# Patient Record
Sex: MALE | Race: WHITE | NOT HISPANIC OR LATINO | Employment: FULL TIME | URBAN - METROPOLITAN AREA
[De-identification: names, ages, dates, MRNs, and addresses within clinical notes are randomized per-mention and may not be internally consistent; named-entity substitution may affect disease eponyms.]

---

## 2024-08-17 ENCOUNTER — HOSPITAL ENCOUNTER (OUTPATIENT)
Dept: RADIOLOGY | Facility: MEDICAL CENTER | Age: 49
End: 2024-08-17
Payer: COMMERCIAL

## 2024-08-17 ENCOUNTER — HOSPITAL ENCOUNTER (EMERGENCY)
Facility: MEDICAL CENTER | Age: 49
End: 2024-08-17

## 2024-08-18 ENCOUNTER — APPOINTMENT (OUTPATIENT)
Dept: RADIOLOGY | Facility: MEDICAL CENTER | Age: 49
DRG: 515 | End: 2024-08-18
Attending: ORTHOPAEDIC SURGERY
Payer: COMMERCIAL

## 2024-08-18 ENCOUNTER — ANESTHESIA (OUTPATIENT)
Dept: SURGERY | Facility: MEDICAL CENTER | Age: 49
DRG: 515 | End: 2024-08-18
Payer: COMMERCIAL

## 2024-08-18 ENCOUNTER — HOSPITAL ENCOUNTER (INPATIENT)
Facility: MEDICAL CENTER | Age: 49
LOS: 2 days | DRG: 515 | End: 2024-08-20
Attending: EMERGENCY MEDICINE | Admitting: SURGERY
Payer: COMMERCIAL

## 2024-08-18 ENCOUNTER — HOSPITAL ENCOUNTER (OUTPATIENT)
Dept: RADIOLOGY | Facility: MEDICAL CENTER | Age: 49
End: 2024-08-18

## 2024-08-18 ENCOUNTER — APPOINTMENT (OUTPATIENT)
Dept: RADIOLOGY | Facility: MEDICAL CENTER | Age: 49
DRG: 515 | End: 2024-08-18
Attending: EMERGENCY MEDICINE
Payer: COMMERCIAL

## 2024-08-18 ENCOUNTER — ANESTHESIA EVENT (OUTPATIENT)
Dept: SURGERY | Facility: MEDICAL CENTER | Age: 49
DRG: 515 | End: 2024-08-18
Payer: COMMERCIAL

## 2024-08-18 DIAGNOSIS — J90 PLEURAL EFFUSION: ICD-10-CM

## 2024-08-18 DIAGNOSIS — S42.021A CLOSED DISPLACED FRACTURE OF SHAFT OF RIGHT CLAVICLE, INITIAL ENCOUNTER: ICD-10-CM

## 2024-08-18 DIAGNOSIS — S22.41XA CLOSED FRACTURE OF MULTIPLE RIBS OF RIGHT SIDE, INITIAL ENCOUNTER: ICD-10-CM

## 2024-08-18 DIAGNOSIS — T14.90XA TRAUMA: ICD-10-CM
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
## 2024-08-18 PROBLEM — I10 PRIMARY HYPERTENSION: Status: ACTIVE | Noted: 2024-08-18

## 2024-08-18 PROBLEM — R91.1 NODULE OF RIGHT LUNG: Status: ACTIVE | Noted: 2024-08-18

## 2024-08-18 PROBLEM — N18.2 STAGE 2 CHRONIC KIDNEY DISEASE: Status: ACTIVE | Noted: 2024-08-18

## 2024-08-18 PROBLEM — Z78.9 NO CONTRAINDICATION TO DEEP VEIN THROMBOSIS (DVT) PROPHYLAXIS: Status: ACTIVE | Noted: 2024-08-18

## 2024-08-18 PROBLEM — S27.321A RIGHT PULMONARY CONTUSION: Status: ACTIVE | Noted: 2024-08-18

## 2024-08-18 PROBLEM — G62.9 NEUROPATHY: Status: ACTIVE | Noted: 2024-08-18

## 2024-08-18 PROBLEM — Z53.09 CONTRAINDICATION TO DEEP VEIN THROMBOSIS (DVT) PROPHYLAXIS: Status: ACTIVE | Noted: 2024-08-18

## 2024-08-18 PROBLEM — R55 SYNCOPE: Status: ACTIVE | Noted: 2024-08-18

## 2024-08-18 PROBLEM — J96.01 ACUTE RESPIRATORY FAILURE WITH HYPOXIA (HCC): Status: ACTIVE | Noted: 2024-08-18

## 2024-08-18 PROBLEM — E11.9 TYPE 2 DIABETES MELLITUS WITHOUT COMPLICATION, WITHOUT LONG-TERM CURRENT USE OF INSULIN (HCC): Status: ACTIVE | Noted: 2024-08-18

## 2024-08-18 PROBLEM — E78.2 MIXED HYPERLIPIDEMIA: Status: ACTIVE | Noted: 2024-08-18

## 2024-08-18 PROBLEM — J93.9 PNEUMOTHORAX ON RIGHT: Status: ACTIVE | Noted: 2024-08-18

## 2024-08-18 PROBLEM — S22.43XA CLOSED FRACTURE OF MULTIPLE RIBS OF BOTH SIDES: Status: ACTIVE | Noted: 2024-08-18

## 2024-08-18 LAB
ABO GROUP BLD: NORMAL
ALBUMIN SERPL BCP-MCNC: 3.5 G/DL (ref 3.2–4.9)
ALBUMIN/GLOB SERPL: 1.3 G/DL
ALP SERPL-CCNC: 83 U/L (ref 30–99)
ALT SERPL-CCNC: 21 U/L (ref 2–50)
ANION GAP SERPL CALC-SCNC: 12 MMOL/L (ref 7–16)
APTT PPP: 30.3 SEC (ref 24.7–36)
AST SERPL-CCNC: 28 U/L (ref 12–45)
BILIRUB SERPL-MCNC: 0.3 MG/DL (ref 0.1–1.5)
BLD GP AB SCN SERPL QL: NORMAL
BUN SERPL-MCNC: 25 MG/DL (ref 8–22)
CALCIUM ALBUM COR SERPL-MCNC: 9 MG/DL (ref 8.5–10.5)
CALCIUM SERPL-MCNC: 8.6 MG/DL (ref 8.5–10.5)
CHLORIDE SERPL-SCNC: 104 MMOL/L (ref 96–112)
CO2 SERPL-SCNC: 21 MMOL/L (ref 20–33)
CREAT SERPL-MCNC: 1.88 MG/DL (ref 0.5–1.4)
ERYTHROCYTE [DISTWIDTH] IN BLOOD BY AUTOMATED COUNT: 43.7 FL (ref 35.9–50)
ETHANOL BLD-MCNC: <10.1 MG/DL
GFR SERPLBLD CREATININE-BSD FMLA CKD-EPI: 43 ML/MIN/1.73 M 2
GLOBULIN SER CALC-MCNC: 2.7 G/DL (ref 1.9–3.5)
GLUCOSE BLD STRIP.AUTO-MCNC: 112 MG/DL (ref 65–99)
GLUCOSE BLD STRIP.AUTO-MCNC: 135 MG/DL (ref 65–99)
GLUCOSE BLD STRIP.AUTO-MCNC: 138 MG/DL (ref 65–99)
GLUCOSE BLD STRIP.AUTO-MCNC: 93 MG/DL (ref 65–99)
GLUCOSE BLD STRIP.AUTO-MCNC: 94 MG/DL (ref 65–99)
GLUCOSE SERPL-MCNC: 126 MG/DL (ref 65–99)
HCT VFR BLD AUTO: 36.4 % (ref 42–52)
HGB BLD-MCNC: 12.2 G/DL (ref 14–18)
INR PPP: 0.93 (ref 0.87–1.13)
MCH RBC QN AUTO: 29.4 PG (ref 27–33)
MCHC RBC AUTO-ENTMCNC: 33.5 G/DL (ref 32.3–36.5)
MCV RBC AUTO: 87.7 FL (ref 81.4–97.8)
PLATELET # BLD AUTO: 249 K/UL (ref 164–446)
PMV BLD AUTO: 9.3 FL (ref 9–12.9)
POTASSIUM SERPL-SCNC: 4.8 MMOL/L (ref 3.6–5.5)
PROT SERPL-MCNC: 6.2 G/DL (ref 6–8.2)
PROTHROMBIN TIME: 12.6 SEC (ref 12–14.6)
RBC # BLD AUTO: 4.15 M/UL (ref 4.7–6.1)
RH BLD: NORMAL
SODIUM SERPL-SCNC: 137 MMOL/L (ref 135–145)
WBC # BLD AUTO: 9.6 K/UL (ref 4.8–10.8)

## 2024-08-18 PROCEDURE — 71045 X-RAY EXAM CHEST 1 VIEW: CPT

## 2024-08-18 PROCEDURE — 99223 1ST HOSP IP/OBS HIGH 75: CPT | Performed by: SURGERY

## 2024-08-18 PROCEDURE — 700111 HCHG RX REV CODE 636 W/ 250 OVERRIDE (IP): Performed by: EMERGENCY MEDICINE

## 2024-08-18 PROCEDURE — A9270 NON-COVERED ITEM OR SERVICE: HCPCS | Performed by: REGISTERED NURSE

## 2024-08-18 PROCEDURE — 160002 HCHG RECOVERY MINUTES (STAT): Performed by: ORTHOPAEDIC SURGERY

## 2024-08-18 PROCEDURE — 700105 HCHG RX REV CODE 258: Performed by: REGISTERED NURSE

## 2024-08-18 PROCEDURE — A9270 NON-COVERED ITEM OR SERVICE: HCPCS | Performed by: PHYSICIAN ASSISTANT

## 2024-08-18 PROCEDURE — 160035 HCHG PACU - 1ST 60 MINS PHASE I: Performed by: ORTHOPAEDIC SURGERY

## 2024-08-18 PROCEDURE — 99291 CRITICAL CARE FIRST HOUR: CPT

## 2024-08-18 PROCEDURE — 80053 COMPREHEN METABOLIC PANEL: CPT

## 2024-08-18 PROCEDURE — 85730 THROMBOPLASTIN TIME PARTIAL: CPT

## 2024-08-18 PROCEDURE — 700102 HCHG RX REV CODE 250 W/ 637 OVERRIDE(OP): Performed by: ANESTHESIOLOGY

## 2024-08-18 PROCEDURE — 82077 ASSAY SPEC XCP UR&BREATH IA: CPT

## 2024-08-18 PROCEDURE — 86850 RBC ANTIBODY SCREEN: CPT

## 2024-08-18 PROCEDURE — 23515 OPTX CLAVICULAR FX W/INT FIX: CPT | Mod: RT | Performed by: ORTHOPAEDIC SURGERY

## 2024-08-18 PROCEDURE — 700102 HCHG RX REV CODE 250 W/ 637 OVERRIDE(OP): Performed by: PHYSICIAN ASSISTANT

## 2024-08-18 PROCEDURE — 700101 HCHG RX REV CODE 250: Performed by: REGISTERED NURSE

## 2024-08-18 PROCEDURE — 700111 HCHG RX REV CODE 636 W/ 250 OVERRIDE (IP): Performed by: ANESTHESIOLOGY

## 2024-08-18 PROCEDURE — 700111 HCHG RX REV CODE 636 W/ 250 OVERRIDE (IP): Mod: JZ | Performed by: REGISTERED NURSE

## 2024-08-18 PROCEDURE — 85027 COMPLETE CBC AUTOMATED: CPT

## 2024-08-18 PROCEDURE — 85610 PROTHROMBIN TIME: CPT

## 2024-08-18 PROCEDURE — 160029 HCHG SURGERY MINUTES - 1ST 30 MINS LEVEL 4: Performed by: ORTHOPAEDIC SURGERY

## 2024-08-18 PROCEDURE — 160048 HCHG OR STATISTICAL LEVEL 1-5: Performed by: ORTHOPAEDIC SURGERY

## 2024-08-18 PROCEDURE — 160036 HCHG PACU - EA ADDL 30 MINS PHASE I: Performed by: ORTHOPAEDIC SURGERY

## 2024-08-18 PROCEDURE — 86900 BLOOD TYPING SEROLOGIC ABO: CPT

## 2024-08-18 PROCEDURE — 86901 BLOOD TYPING SEROLOGIC RH(D): CPT

## 2024-08-18 PROCEDURE — 160041 HCHG SURGERY MINUTES - EA ADDL 1 MIN LEVEL 4: Performed by: ORTHOPAEDIC SURGERY

## 2024-08-18 PROCEDURE — C1713 ANCHOR/SCREW BN/BN,TIS/BN: HCPCS | Performed by: ORTHOPAEDIC SURGERY

## 2024-08-18 PROCEDURE — 73000 X-RAY EXAM OF COLLAR BONE: CPT | Mod: RT

## 2024-08-18 PROCEDURE — 96374 THER/PROPH/DIAG INJ IV PUSH: CPT

## 2024-08-18 PROCEDURE — 700111 HCHG RX REV CODE 636 W/ 250 OVERRIDE (IP): Mod: JZ | Performed by: ANESTHESIOLOGY

## 2024-08-18 PROCEDURE — 94669 MECHANICAL CHEST WALL OSCILL: CPT

## 2024-08-18 PROCEDURE — 82962 GLUCOSE BLOOD TEST: CPT

## 2024-08-18 PROCEDURE — 99222 1ST HOSP IP/OBS MODERATE 55: CPT | Mod: 57 | Performed by: ORTHOPAEDIC SURGERY

## 2024-08-18 PROCEDURE — A9270 NON-COVERED ITEM OR SERVICE: HCPCS | Performed by: ANESTHESIOLOGY

## 2024-08-18 PROCEDURE — 770022 HCHG ROOM/CARE - ICU (200)

## 2024-08-18 PROCEDURE — G0390 TRAUMA RESPONS W/HOSP CRITI: HCPCS

## 2024-08-18 PROCEDURE — 160009 HCHG ANES TIME/MIN: Performed by: ORTHOPAEDIC SURGERY

## 2024-08-18 PROCEDURE — 0PS904Z REPOSITION RIGHT CLAVICLE WITH INTERNAL FIXATION DEVICE, OPEN APPROACH: ICD-10-PCS | Performed by: ORTHOPAEDIC SURGERY

## 2024-08-18 PROCEDURE — 700101 HCHG RX REV CODE 250: Performed by: ANESTHESIOLOGY

## 2024-08-18 PROCEDURE — 700102 HCHG RX REV CODE 250 W/ 637 OVERRIDE(OP): Performed by: REGISTERED NURSE

## 2024-08-18 PROCEDURE — 36415 COLL VENOUS BLD VENIPUNCTURE: CPT

## 2024-08-18 DEVICE — SCREW 2.5 MM NON-LOCKING TI X 12MM LONG (6TX8=48): Type: IMPLANTABLE DEVICE | Site: CHEST | Status: FUNCTIONAL

## 2024-08-18 DEVICE — SCREW 3.5 MM LOCKING TI X 16MM LONG (6TX8+2TX5=58): Type: IMPLANTABLE DEVICE | Site: CHEST | Status: FUNCTIONAL

## 2024-08-18 DEVICE — IMPLANTABLE DEVICE: Type: IMPLANTABLE DEVICE | Site: CHEST | Status: FUNCTIONAL

## 2024-08-18 DEVICE — SCREW 3.5 MM NON-LOCKING TI X 14MM LONG (6TX8+2TX5=58): Type: IMPLANTABLE DEVICE | Site: CHEST | Status: FUNCTIONAL

## 2024-08-18 DEVICE — SCREW 3.5 MM NON-LOCKING TI X 16MM LONG (6TX8+2TX5=58): Type: IMPLANTABLE DEVICE | Site: CHEST | Status: FUNCTIONAL

## 2024-08-18 DEVICE — SCREW 3.5 MM LOCKING TI X 14MM LONG (6TX8+2TX5=58): Type: IMPLANTABLE DEVICE | Site: CHEST | Status: FUNCTIONAL

## 2024-08-18 DEVICE — SCREW 2.5 MM NON-LOCKING TI X 14MM LONG (6TX8=48): Type: IMPLANTABLE DEVICE | Site: CHEST | Status: FUNCTIONAL

## 2024-08-18 RX ORDER — FAMOTIDINE 20 MG/1
20 TABLET, FILM COATED ORAL DAILY
Status: DISCONTINUED | OUTPATIENT
Start: 2024-08-18 | End: 2024-08-20 | Stop reason: HOSPADM

## 2024-08-18 RX ORDER — ACETAMINOPHEN 500 MG
1000 TABLET ORAL EVERY 6 HOURS PRN
Status: DISCONTINUED | OUTPATIENT
Start: 2024-08-23 | End: 2024-08-20 | Stop reason: HOSPADM

## 2024-08-18 RX ORDER — HYDROMORPHONE HYDROCHLORIDE 1 MG/ML
1 INJECTION, SOLUTION INTRAMUSCULAR; INTRAVENOUS; SUBCUTANEOUS
Status: DISCONTINUED | OUTPATIENT
Start: 2024-08-18 | End: 2024-08-19

## 2024-08-18 RX ORDER — HALOPERIDOL 5 MG/ML
1 INJECTION INTRAMUSCULAR
Status: DISCONTINUED | OUTPATIENT
Start: 2024-08-18 | End: 2024-08-18 | Stop reason: HOSPADM

## 2024-08-18 RX ORDER — IBUPROFEN 200 MG
600 TABLET ORAL EVERY 6 HOURS PRN
Status: ON HOLD | COMMUNITY
End: 2024-08-20

## 2024-08-18 RX ORDER — DIPHENHYDRAMINE HYDROCHLORIDE 50 MG/ML
12.5 INJECTION, SOLUTION INTRAMUSCULAR; INTRAVENOUS
Status: DISCONTINUED | OUTPATIENT
Start: 2024-08-18 | End: 2024-08-18 | Stop reason: HOSPADM

## 2024-08-18 RX ORDER — HYDROMORPHONE HYDROCHLORIDE 1 MG/ML
INJECTION, SOLUTION INTRAMUSCULAR; INTRAVENOUS; SUBCUTANEOUS
Status: COMPLETED | OUTPATIENT
Start: 2024-08-18 | End: 2024-08-18

## 2024-08-18 RX ORDER — OXYCODONE HYDROCHLORIDE 5 MG/1
5 TABLET ORAL
Status: DISCONTINUED | OUTPATIENT
Start: 2024-08-18 | End: 2024-08-18

## 2024-08-18 RX ORDER — SODIUM CHLORIDE, SODIUM LACTATE, POTASSIUM CHLORIDE, CALCIUM CHLORIDE 600; 310; 30; 20 MG/100ML; MG/100ML; MG/100ML; MG/100ML
INJECTION, SOLUTION INTRAVENOUS CONTINUOUS
Status: DISCONTINUED | OUTPATIENT
Start: 2024-08-18 | End: 2024-08-19

## 2024-08-18 RX ORDER — IPRATROPIUM BROMIDE AND ALBUTEROL SULFATE 2.5; .5 MG/3ML; MG/3ML
3 SOLUTION RESPIRATORY (INHALATION)
Status: DISCONTINUED | OUTPATIENT
Start: 2024-08-18 | End: 2024-08-18 | Stop reason: HOSPADM

## 2024-08-18 RX ORDER — GABAPENTIN 300 MG/1
300 CAPSULE ORAL EVERY 8 HOURS
Status: DISCONTINUED | OUTPATIENT
Start: 2024-08-18 | End: 2024-08-20 | Stop reason: HOSPADM

## 2024-08-18 RX ORDER — OXYCODONE HYDROCHLORIDE 10 MG/1
10 TABLET ORAL
Status: DISCONTINUED | OUTPATIENT
Start: 2024-08-18 | End: 2024-08-19

## 2024-08-18 RX ORDER — DEXTROSE MONOHYDRATE 25 G/50ML
12.5 INJECTION, SOLUTION INTRAVENOUS
Status: DISCONTINUED | OUTPATIENT
Start: 2024-08-18 | End: 2024-08-18 | Stop reason: HOSPADM

## 2024-08-18 RX ORDER — OXYCODONE AND ACETAMINOPHEN 5; 325 MG/1; MG/1
1 TABLET ORAL
Status: COMPLETED | OUTPATIENT
Start: 2024-08-18 | End: 2024-08-18

## 2024-08-18 RX ORDER — LIDOCAINE HYDROCHLORIDE 20 MG/ML
INJECTION, SOLUTION EPIDURAL; INFILTRATION; INTRACAUDAL; PERINEURAL PRN
Status: DISCONTINUED | OUTPATIENT
Start: 2024-08-18 | End: 2024-08-18 | Stop reason: SURG

## 2024-08-18 RX ORDER — HYDROMORPHONE HYDROCHLORIDE 1 MG/ML
0.2 INJECTION, SOLUTION INTRAMUSCULAR; INTRAVENOUS; SUBCUTANEOUS
Status: DISCONTINUED | OUTPATIENT
Start: 2024-08-18 | End: 2024-08-18 | Stop reason: HOSPADM

## 2024-08-18 RX ORDER — SIMVASTATIN 40 MG
40 TABLET ORAL NIGHTLY
Status: DISCONTINUED | OUTPATIENT
Start: 2024-08-18 | End: 2024-08-20 | Stop reason: HOSPADM

## 2024-08-18 RX ORDER — OXYCODONE AND ACETAMINOPHEN 5; 325 MG/1; MG/1
2 TABLET ORAL
Status: COMPLETED | OUTPATIENT
Start: 2024-08-18 | End: 2024-08-18

## 2024-08-18 RX ORDER — NICOTINE 21 MG/24HR
21 PATCH, TRANSDERMAL 24 HOURS TRANSDERMAL
Status: DISCONTINUED | OUTPATIENT
Start: 2024-08-18 | End: 2024-08-20 | Stop reason: HOSPADM

## 2024-08-18 RX ORDER — METOPROLOL TARTRATE 1 MG/ML
1 INJECTION, SOLUTION INTRAVENOUS
Status: DISCONTINUED | OUTPATIENT
Start: 2024-08-18 | End: 2024-08-18 | Stop reason: HOSPADM

## 2024-08-18 RX ORDER — HYDROMORPHONE HYDROCHLORIDE 1 MG/ML
0.1 INJECTION, SOLUTION INTRAMUSCULAR; INTRAVENOUS; SUBCUTANEOUS
Status: DISCONTINUED | OUTPATIENT
Start: 2024-08-18 | End: 2024-08-18 | Stop reason: HOSPADM

## 2024-08-18 RX ORDER — SIMVASTATIN 40 MG
40 TABLET ORAL NIGHTLY
COMMUNITY

## 2024-08-18 RX ORDER — MIDAZOLAM HYDROCHLORIDE 1 MG/ML
INJECTION INTRAMUSCULAR; INTRAVENOUS PRN
Status: DISCONTINUED | OUTPATIENT
Start: 2024-08-18 | End: 2024-08-18 | Stop reason: SURG

## 2024-08-18 RX ORDER — POLYETHYLENE GLYCOL 3350 17 G/17G
1 POWDER, FOR SOLUTION ORAL 2 TIMES DAILY
Status: DISCONTINUED | OUTPATIENT
Start: 2024-08-18 | End: 2024-08-20 | Stop reason: HOSPADM

## 2024-08-18 RX ORDER — DOCUSATE SODIUM 100 MG/1
100 CAPSULE, LIQUID FILLED ORAL 2 TIMES DAILY
Status: DISCONTINUED | OUTPATIENT
Start: 2024-08-18 | End: 2024-08-20 | Stop reason: HOSPADM

## 2024-08-18 RX ORDER — METAXALONE 800 MG/1
800 TABLET ORAL 3 TIMES DAILY
Status: DISCONTINUED | OUTPATIENT
Start: 2024-08-18 | End: 2024-08-20 | Stop reason: HOSPADM

## 2024-08-18 RX ORDER — ACETAMINOPHEN 500 MG
1000 TABLET ORAL EVERY 6 HOURS PRN
COMMUNITY

## 2024-08-18 RX ORDER — SODIUM CHLORIDE, SODIUM LACTATE, POTASSIUM CHLORIDE, CALCIUM CHLORIDE 600; 310; 30; 20 MG/100ML; MG/100ML; MG/100ML; MG/100ML
INJECTION, SOLUTION INTRAVENOUS CONTINUOUS
Status: DISCONTINUED | OUTPATIENT
Start: 2024-08-18 | End: 2024-08-18 | Stop reason: HOSPADM

## 2024-08-18 RX ORDER — HYDRALAZINE HYDROCHLORIDE 20 MG/ML
10 INJECTION INTRAMUSCULAR; INTRAVENOUS EVERY 4 HOURS PRN
Status: DISCONTINUED | OUTPATIENT
Start: 2024-08-18 | End: 2024-08-20 | Stop reason: HOSPADM

## 2024-08-18 RX ORDER — DAPAGLIFLOZIN 10 MG/1
10 TABLET, FILM COATED ORAL DAILY
COMMUNITY

## 2024-08-18 RX ORDER — LISINOPRIL 20 MG/1
20 TABLET ORAL DAILY
COMMUNITY

## 2024-08-18 RX ORDER — DEXTROSE MONOHYDRATE 25 G/50ML
25 INJECTION, SOLUTION INTRAVENOUS
Status: DISCONTINUED | OUTPATIENT
Start: 2024-08-18 | End: 2024-08-20 | Stop reason: HOSPADM

## 2024-08-18 RX ORDER — ONDANSETRON 2 MG/ML
4 INJECTION INTRAMUSCULAR; INTRAVENOUS
Status: DISCONTINUED | OUTPATIENT
Start: 2024-08-18 | End: 2024-08-18 | Stop reason: HOSPADM

## 2024-08-18 RX ORDER — DAPAGLIFLOZIN 10 MG/1
10 TABLET, FILM COATED ORAL DAILY
Status: DISCONTINUED | OUTPATIENT
Start: 2024-08-18 | End: 2024-08-20 | Stop reason: HOSPADM

## 2024-08-18 RX ORDER — DEXAMETHASONE SODIUM PHOSPHATE 4 MG/ML
INJECTION, SOLUTION INTRA-ARTICULAR; INTRALESIONAL; INTRAMUSCULAR; INTRAVENOUS; SOFT TISSUE PRN
Status: DISCONTINUED | OUTPATIENT
Start: 2024-08-18 | End: 2024-08-18 | Stop reason: SURG

## 2024-08-18 RX ORDER — HYDROMORPHONE HYDROCHLORIDE 1 MG/ML
0.5 INJECTION, SOLUTION INTRAMUSCULAR; INTRAVENOUS; SUBCUTANEOUS
Status: DISCONTINUED | OUTPATIENT
Start: 2024-08-18 | End: 2024-08-18

## 2024-08-18 RX ORDER — ONDANSETRON 2 MG/ML
INJECTION INTRAMUSCULAR; INTRAVENOUS PRN
Status: DISCONTINUED | OUTPATIENT
Start: 2024-08-18 | End: 2024-08-18 | Stop reason: SURG

## 2024-08-18 RX ORDER — CEFAZOLIN SODIUM 1 G/3ML
INJECTION, POWDER, FOR SOLUTION INTRAMUSCULAR; INTRAVENOUS PRN
Status: DISCONTINUED | OUTPATIENT
Start: 2024-08-18 | End: 2024-08-18 | Stop reason: SURG

## 2024-08-18 RX ORDER — ACETAMINOPHEN 500 MG
1000 TABLET ORAL EVERY 6 HOURS
Status: DISCONTINUED | OUTPATIENT
Start: 2024-08-18 | End: 2024-08-20 | Stop reason: HOSPADM

## 2024-08-18 RX ORDER — AMOXICILLIN 250 MG
1 CAPSULE ORAL NIGHTLY
Status: DISCONTINUED | OUTPATIENT
Start: 2024-08-18 | End: 2024-08-20 | Stop reason: HOSPADM

## 2024-08-18 RX ORDER — BISACODYL 10 MG
10 SUPPOSITORY, RECTAL RECTAL
Status: DISCONTINUED | OUTPATIENT
Start: 2024-08-18 | End: 2024-08-20 | Stop reason: HOSPADM

## 2024-08-18 RX ORDER — OXYCODONE HYDROCHLORIDE 5 MG/1
5 TABLET ORAL
Status: DISCONTINUED | OUTPATIENT
Start: 2024-08-18 | End: 2024-08-19

## 2024-08-18 RX ORDER — LIDOCAINE 4 G/G
2 PATCH TOPICAL EVERY 24 HOURS
Status: DISCONTINUED | OUTPATIENT
Start: 2024-08-18 | End: 2024-08-20 | Stop reason: HOSPADM

## 2024-08-18 RX ORDER — FAMOTIDINE 20 MG/1
20 TABLET, FILM COATED ORAL DAILY
COMMUNITY

## 2024-08-18 RX ORDER — GABAPENTIN 100 MG/1
100 CAPSULE ORAL EVERY EVENING
COMMUNITY

## 2024-08-18 RX ORDER — ROCURONIUM BROMIDE 10 MG/ML
INJECTION, SOLUTION INTRAVENOUS PRN
Status: DISCONTINUED | OUTPATIENT
Start: 2024-08-18 | End: 2024-08-18 | Stop reason: SURG

## 2024-08-18 RX ORDER — LISINOPRIL 20 MG/1
20 TABLET ORAL DAILY
Status: DISCONTINUED | OUTPATIENT
Start: 2024-08-18 | End: 2024-08-19

## 2024-08-18 RX ORDER — HYDRALAZINE HYDROCHLORIDE 20 MG/ML
5 INJECTION INTRAMUSCULAR; INTRAVENOUS
Status: DISCONTINUED | OUTPATIENT
Start: 2024-08-18 | End: 2024-08-18 | Stop reason: HOSPADM

## 2024-08-18 RX ORDER — AMOXICILLIN 250 MG
1 CAPSULE ORAL
Status: DISCONTINUED | OUTPATIENT
Start: 2024-08-18 | End: 2024-08-20 | Stop reason: HOSPADM

## 2024-08-18 RX ORDER — LIDOCAINE HYDROCHLORIDE 40 MG/ML
SOLUTION TOPICAL PRN
Status: DISCONTINUED | OUTPATIENT
Start: 2024-08-18 | End: 2024-08-18 | Stop reason: SURG

## 2024-08-18 RX ORDER — ENOXAPARIN SODIUM 100 MG/ML
40 INJECTION SUBCUTANEOUS DAILY
Status: DISCONTINUED | OUTPATIENT
Start: 2024-08-19 | End: 2024-08-20 | Stop reason: HOSPADM

## 2024-08-18 RX ORDER — MEPERIDINE HYDROCHLORIDE 25 MG/ML
12.5 INJECTION INTRAMUSCULAR; INTRAVENOUS; SUBCUTANEOUS
Status: DISCONTINUED | OUTPATIENT
Start: 2024-08-18 | End: 2024-08-18 | Stop reason: HOSPADM

## 2024-08-18 RX ORDER — ONDANSETRON 2 MG/ML
4 INJECTION INTRAMUSCULAR; INTRAVENOUS EVERY 4 HOURS PRN
Status: DISCONTINUED | OUTPATIENT
Start: 2024-08-18 | End: 2024-08-20 | Stop reason: HOSPADM

## 2024-08-18 RX ORDER — GABAPENTIN 100 MG/1
200 CAPSULE ORAL EVERY 8 HOURS
Status: DISCONTINUED | OUTPATIENT
Start: 2024-08-18 | End: 2024-08-18

## 2024-08-18 RX ORDER — HYDROMORPHONE HYDROCHLORIDE 1 MG/ML
0.4 INJECTION, SOLUTION INTRAMUSCULAR; INTRAVENOUS; SUBCUTANEOUS
Status: DISCONTINUED | OUTPATIENT
Start: 2024-08-18 | End: 2024-08-18 | Stop reason: HOSPADM

## 2024-08-18 RX ORDER — HYDROMORPHONE HYDROCHLORIDE 2 MG/ML
INJECTION, SOLUTION INTRAMUSCULAR; INTRAVENOUS; SUBCUTANEOUS PRN
Status: DISCONTINUED | OUTPATIENT
Start: 2024-08-18 | End: 2024-08-18 | Stop reason: SURG

## 2024-08-18 RX ORDER — ONDANSETRON 4 MG/1
4 TABLET, ORALLY DISINTEGRATING ORAL EVERY 4 HOURS PRN
Status: DISCONTINUED | OUTPATIENT
Start: 2024-08-18 | End: 2024-08-20 | Stop reason: HOSPADM

## 2024-08-18 RX ORDER — ERGOCALCIFEROL 1.25 MG/1
50000 CAPSULE, LIQUID FILLED ORAL
COMMUNITY

## 2024-08-18 RX ORDER — OXYCODONE HYDROCHLORIDE 10 MG/1
10 TABLET ORAL
Status: DISCONTINUED | OUTPATIENT
Start: 2024-08-18 | End: 2024-08-18

## 2024-08-18 RX ADMIN — ROCURONIUM BROMIDE 50 MG: 50 INJECTION, SOLUTION INTRAVENOUS at 16:04

## 2024-08-18 RX ADMIN — MIDAZOLAM HYDROCHLORIDE 2 MG: 2 INJECTION, SOLUTION INTRAMUSCULAR; INTRAVENOUS at 16:00

## 2024-08-18 RX ADMIN — SENNOSIDES AND DOCUSATE SODIUM 1 TABLET: 50; 8.6 TABLET ORAL at 21:10

## 2024-08-18 RX ADMIN — DAPAGLIFLOZIN 10 MG: 10 TABLET, FILM COATED ORAL at 10:06

## 2024-08-18 RX ADMIN — ACETAMINOPHEN 1000 MG: 500 TABLET ORAL at 13:02

## 2024-08-18 RX ADMIN — HYDROMORPHONE HYDROCHLORIDE 0.4 MG: 1 INJECTION, SOLUTION INTRAMUSCULAR; INTRAVENOUS; SUBCUTANEOUS at 18:04

## 2024-08-18 RX ADMIN — LIDOCAINE 2 PATCH: 4 PATCH TOPICAL at 09:03

## 2024-08-18 RX ADMIN — HYDROMORPHONE HYDROCHLORIDE 0.4 MG: 1 INJECTION, SOLUTION INTRAMUSCULAR; INTRAVENOUS; SUBCUTANEOUS at 17:50

## 2024-08-18 RX ADMIN — LIDOCAINE HYDROCHLORIDE 4 ML: 40 SOLUTION TOPICAL at 16:06

## 2024-08-18 RX ADMIN — ONDANSETRON 4 MG: 2 INJECTION INTRAMUSCULAR; INTRAVENOUS at 16:30

## 2024-08-18 RX ADMIN — METAXALONE 800 MG: 800 TABLET ORAL at 19:29

## 2024-08-18 RX ADMIN — OXYCODONE HYDROCHLORIDE 5 MG: 5 TABLET ORAL at 10:06

## 2024-08-18 RX ADMIN — LISINOPRIL 20 MG: 20 TABLET ORAL at 10:06

## 2024-08-18 RX ADMIN — METAXALONE 800 MG: 800 TABLET ORAL at 09:03

## 2024-08-18 RX ADMIN — GABAPENTIN 300 MG: 300 CAPSULE ORAL at 13:02

## 2024-08-18 RX ADMIN — SUGAMMADEX 200 MG: 100 INJECTION, SOLUTION INTRAVENOUS at 16:30

## 2024-08-18 RX ADMIN — ACETAMINOPHEN 1000 MG: 500 TABLET ORAL at 19:30

## 2024-08-18 RX ADMIN — HYDROMORPHONE HYDROCHLORIDE 0.5 MG: 1 INJECTION, SOLUTION INTRAMUSCULAR; INTRAVENOUS; SUBCUTANEOUS at 08:51

## 2024-08-18 RX ADMIN — PROPOFOL 50 MG: 10 INJECTION, EMULSION INTRAVENOUS at 16:33

## 2024-08-18 RX ADMIN — CEFAZOLIN 2 G: 1 INJECTION, POWDER, FOR SOLUTION INTRAMUSCULAR; INTRAVENOUS at 16:04

## 2024-08-18 RX ADMIN — FAMOTIDINE 20 MG: 20 TABLET, FILM COATED ORAL at 10:06

## 2024-08-18 RX ADMIN — HYDROMORPHONE HYDROCHLORIDE 0.4 MG: 1 INJECTION, SOLUTION INTRAMUSCULAR; INTRAVENOUS; SUBCUTANEOUS at 17:30

## 2024-08-18 RX ADMIN — HYDROMORPHONE HYDROCHLORIDE 1 MG: 2 INJECTION INTRAMUSCULAR; INTRAVENOUS; SUBCUTANEOUS at 16:00

## 2024-08-18 RX ADMIN — SODIUM CHLORIDE, POTASSIUM CHLORIDE, SODIUM LACTATE AND CALCIUM CHLORIDE: 600; 310; 30; 20 INJECTION, SOLUTION INTRAVENOUS at 08:56

## 2024-08-18 RX ADMIN — FENTANYL CITRATE 50 MCG: 50 INJECTION, SOLUTION INTRAMUSCULAR; INTRAVENOUS at 17:09

## 2024-08-18 RX ADMIN — HYDROMORPHONE HYDROCHLORIDE 0.4 MG: 1 INJECTION, SOLUTION INTRAMUSCULAR; INTRAVENOUS; SUBCUTANEOUS at 17:20

## 2024-08-18 RX ADMIN — POLYETHYLENE GLYCOL 3350 1 PACKET: 17 POWDER, FOR SOLUTION ORAL at 09:03

## 2024-08-18 RX ADMIN — METAXALONE 800 MG: 800 TABLET ORAL at 13:02

## 2024-08-18 RX ADMIN — ACETAMINOPHEN 1000 MG: 500 TABLET ORAL at 09:04

## 2024-08-18 RX ADMIN — LIDOCAINE HYDROCHLORIDE 80 MG: 20 INJECTION, SOLUTION EPIDURAL; INFILTRATION; INTRACAUDAL at 16:04

## 2024-08-18 RX ADMIN — DEXAMETHASONE SODIUM PHOSPHATE 8 MG: 4 INJECTION INTRA-ARTICULAR; INTRALESIONAL; INTRAMUSCULAR; INTRAVENOUS; SOFT TISSUE at 16:04

## 2024-08-18 RX ADMIN — GABAPENTIN 200 MG: 100 CAPSULE ORAL at 09:03

## 2024-08-18 RX ADMIN — OXYCODONE AND ACETAMINOPHEN 2 TABLET: 5; 325 TABLET ORAL at 17:01

## 2024-08-18 RX ADMIN — FENTANYL CITRATE 50 MCG: 50 INJECTION, SOLUTION INTRAMUSCULAR; INTRAVENOUS at 17:03

## 2024-08-18 RX ADMIN — PROPOFOL 180 MG: 10 INJECTION, EMULSION INTRAVENOUS at 16:04

## 2024-08-18 RX ADMIN — OXYCODONE HYDROCHLORIDE 5 MG: 5 TABLET ORAL at 21:11

## 2024-08-18 RX ADMIN — HYDROMORPHONE HYDROCHLORIDE 0.4 MG: 1 INJECTION, SOLUTION INTRAMUSCULAR; INTRAVENOUS; SUBCUTANEOUS at 17:56

## 2024-08-18 RX ADMIN — GABAPENTIN 300 MG: 300 CAPSULE ORAL at 21:10

## 2024-08-18 RX ADMIN — DOCUSATE SODIUM 100 MG: 100 CAPSULE, LIQUID FILLED ORAL at 09:04

## 2024-08-18 RX ADMIN — HYDROMORPHONE HYDROCHLORIDE 0.5 MG: 1 INJECTION, SOLUTION INTRAMUSCULAR; INTRAVENOUS; SUBCUTANEOUS at 06:36

## 2024-08-18 RX ADMIN — SIMVASTATIN 40 MG: 20 TABLET, FILM COATED ORAL at 21:10

## 2024-08-18 RX ADMIN — NICOTINE TRANSDERMAL SYSTEM 21 MG: 21 PATCH, EXTENDED RELEASE TRANSDERMAL at 09:03

## 2024-08-18 ASSESSMENT — COPD QUESTIONNAIRES
HAVE YOU SMOKED AT LEAST 100 CIGARETTES IN YOUR ENTIRE LIFE: YES
COPD SCREENING SCORE: 3
DO YOU EVER COUGH UP ANY MUCUS OR PHLEGM?: NO/ONLY WITH OCCASIONAL COLDS OR INFECTIONS
DURING THE PAST 4 WEEKS HOW MUCH DID YOU FEEL SHORT OF BREATH: SOME OF THE TIME

## 2024-08-18 ASSESSMENT — PAIN DESCRIPTION - PAIN TYPE
TYPE: ACUTE PAIN
TYPE: SURGICAL PAIN
TYPE: ACUTE PAIN
TYPE: ACUTE PAIN
TYPE: SURGICAL PAIN
TYPE: ACUTE PAIN
TYPE: SURGICAL PAIN
TYPE: SURGICAL PAIN
TYPE: ACUTE PAIN
TYPE: SURGICAL PAIN
TYPE: ACUTE PAIN
TYPE: SURGICAL PAIN
TYPE: SURGICAL PAIN
TYPE: ACUTE PAIN

## 2024-08-18 NOTE — ASSESSMENT & PLAN NOTE
Requiring 2L O2 by nasal cannula to maintain SpO2 >90%.   Wean oxygen as tolerated.   Aggressive pulmonary hygiene and serial chest radiography.  Tolerating room air

## 2024-08-18 NOTE — PROGRESS NOTES
0840: Pt arrived to ICU    Vitals:   HR: 86  BP: 200/87, pain medication administered  RR: 18  SaO2: 94 on 2L O2  Wt: 80.9kg  Temp 97.3F   _____________________________________________________________    2 RN skin check completed with Kristina        Devices in use, assessed under and interventions (as appropriate) for skin protection:  SCDs, BP cuff, SaO2 monitor      Interventions in place such as:   q2 hour turns  Keeping skin clean and dry  Use of products such as barrier wipes/cream  Waffle cushion while OOB: yes  Bed Type: trauma bed    ______________________________________________________________    Personal belongings:   Belongings, sent with patient's friend    ____________________________________________________________    4 Eyes Skin Assessment Completed by ALEX Jones and ALEX Acevedo.    Head WDL  Ears WDL  Nose WDL reddness to bridge of nose  Mouth WDL  Neck WDL  Breast/Chest WDL  Shoulder Blades WDL  Spine WDL  (R) Arm/Elbow/Hand WDL  (L) Arm/Elbow/Hand WDL  Abdomen WDL  Groin WDL  Scrotum/Coccyx/Buttocks WDL  (R) Leg WDL  (L) Leg WDL  (R) Heel/Foot/Toe WDL  (L) Heel/Foot/Toe WDL          Devices In Places ECG, Blood Pressure Cuff, Pulse Ox, SCD's, and Nasal Cannula      Interventions In Place Gray Ear Foams, Sacral Mepilex, and Q2 Turns    Possible Skin Injury No    Pictures Uploaded Into Epic N/A  Wound Consult Placed N/A  RN Wound Prevention Protocol Ordered No \

## 2024-08-18 NOTE — ANESTHESIA PROCEDURE NOTES
Airway    Date/Time: 8/18/2024 4:06 PM    Performed by: Sal Cabrera M.D.  Authorized by: Sal Cabrera M.D.    Location:  OR  Urgency:  Elective  Indications for Airway Management:  Anesthesia      Spontaneous Ventilation: absent    Sedation Level:  Deep  Preoxygenated: Yes    Patient Position:  Sniffing  Mask Difficulty Assessment:  2 - vent by mask + OA or adjuvant +/- NMBA  Final Airway Type:  Endotracheal airway  Final Endotracheal Airway:  ETT  Cuffed: Yes    Technique Used for Successful ETT Placement:  Video laryngoscopy    Insertion Site:  Oral  Blade Type:  Glide  Laryngoscope Blade/Videolaryngoscope Blade Size:  4  ETT Size (mm):  7.5  Measured from:  Teeth  ETT to Teeth (cm):  22  Placement Verified by: auscultation and capnometry    Cormack-Lehane Classification:  Grade I - full view of glottis  Number of Attempts at Approach:  2  Ventilation Between Attempts:  BVM  Number of Other Approaches Attempted:  1  Unsuccessful Approach(es) for ETT:  Direct laryngoscopy   Grade 3 view of cords with DL. Easy GlideScope intubation.
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
## 2024-08-18 NOTE — ED NOTES
Attempted RA trial, patient dropped to 86% on RA. Patient placed on 1L nasal cannula and now at 94%

## 2024-08-18 NOTE — CARE PLAN
"The patient is Stable - Low risk of patient condition declining or worsening    Shift Goals  Clinical Goals: ORIF; pain mgmt; pulmonary hygiene  Patient Goals: pain mgmt; rest; go home; \"race more\"  Family Goals: updates    Progress made toward(s) clinical / shift goals:     Problem: Pain - Standard  Goal: Alleviation of pain or a reduction in pain to the patient’s comfort goal  Outcome: Progressing     Problem: Knowledge Deficit - Standard  Goal: Patient and family/care givers will demonstrate understanding of plan of care, disease process/condition, diagnostic tests and medications  Outcome: Progressing     Problem: Skin Integrity  Goal: Skin integrity is maintained or improved  Outcome: Progressing     Problem: Fall Risk  Goal: Patient will remain free from falls  Outcome: Progressing       Patient is not progressing towards the following goals:      "

## 2024-08-18 NOTE — ED NOTES
Medication Reconciliation    Medication reconciliation is complete per patient reporting.  - Allergies reviewed.  - No outpatient antibiotics in the last 30 days.  - No anticoagulants in the last 14 days.  - Patient's home pharmacy is [facility] in [place] [phone].    Albertina Dawn, Pharmacy Intern
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
## 2024-08-18 NOTE — PROGRESS NOTES
Patient transported to [facility] Pre-op 9 and received by preop RNs. Report given to receiving RN via voalte prior to transport.
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
## 2024-08-18 NOTE — ASSESSMENT & PLAN NOTE
Small right pneumothorax.   No chest tube required on admission.  Aggressive pulmonary hygiene and serial chest radiography.

## 2024-08-18 NOTE — ASSESSMENT & PLAN NOTE
Right midshaft clavicle fracture.  8/18 Open reduction internal fixation of right clavicle fracture.   Weight bearing status - Partial weightbearing RUE. No lifting > cup of coffee, ok for ADLs, sling for comfort.  Follow up with orthopedic surgery in 2 weeks  Germán Hummel MD. Orthopedic Surgeon. [facility].
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
## 2024-08-18 NOTE — ANESTHESIA TIME REPORT
Anesthesia Start and Stop Event Times       Date Time Event    8/18/2024 1540 Ready for Procedure     1558 Anesthesia Start     1643 Anesthesia Stop          Responsible Staff  08/18/24      Name Role Begin End    Sal Cabrera M.D. Anesth 155 1644          Overtime Reason:  no overtime (within assigned shift)    Comments:
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
## 2024-08-18 NOTE — ED NOTES
Bedside report received from off going RN Tr, assumed care of patient.  POC discussed with patient. Call light within reach, all needs addressed at this time.       Fall risk interventions in place: Keep floor surfaces clean and dry and Accompanied to restroom (all applicable per [facility] Fall risk assessment)   Continuous monitoring: Cardiac Leads, Pulse Ox, or Blood Pressure  IVF/IV medications: Not Applicable   Oxygen: How many liters 2L  Bedside sitter: Not Applicable   Isolation: Not Applicable

## 2024-08-18 NOTE — ED NOTES
Pt being taken upstairs at this time by RNs via gurney. Pt is awake and alert, talking to staff, in no apparent distress at time of transfer. Pt's paperwork and belongings sent upstairs with pt and RNs.
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
## 2024-08-18 NOTE — ASSESSMENT & PLAN NOTE
Right fifth through eighth rib fractures.   Aggressive pulmonary hygiene and multimodal pain management and serial chest radiography.

## 2024-08-18 NOTE — ASSESSMENT & PLAN NOTE
Baseline creatinine 1.6, followed by nephrology baseline   Avoid nephrotoxic agents.   Gentle fluid rehydration.  Trend renal indices.

## 2024-08-19 ENCOUNTER — APPOINTMENT (OUTPATIENT)
Dept: RADIOLOGY | Facility: MEDICAL CENTER | Age: 49
DRG: 515 | End: 2024-08-19
Attending: REGISTERED NURSE
Payer: COMMERCIAL

## 2024-08-19 PROBLEM — J96.01 ACUTE RESPIRATORY FAILURE WITH HYPOXIA (HCC): Status: RESOLVED | Noted: 2024-08-18 | Resolved: 2024-08-19

## 2024-08-19 LAB
ABO + RH BLD: NORMAL
ALBUMIN SERPL BCP-MCNC: 3.5 G/DL (ref 3.2–4.9)
ALBUMIN/GLOB SERPL: 1.5 G/DL
ALP SERPL-CCNC: 75 U/L (ref 30–99)
ALT SERPL-CCNC: 19 U/L (ref 2–50)
ANION GAP SERPL CALC-SCNC: 12 MMOL/L (ref 7–16)
ANION GAP SERPL CALC-SCNC: 14 MMOL/L (ref 7–16)
AST SERPL-CCNC: 27 U/L (ref 12–45)
BASOPHILS # BLD AUTO: 0.1 % (ref 0–1.8)
BASOPHILS # BLD: 0.01 K/UL (ref 0–0.12)
BILIRUB SERPL-MCNC: 0.3 MG/DL (ref 0.1–1.5)
BUN SERPL-MCNC: 31 MG/DL (ref 8–22)
BUN SERPL-MCNC: 35 MG/DL (ref 8–22)
CALCIUM ALBUM COR SERPL-MCNC: 9.1 MG/DL (ref 8.5–10.5)
CALCIUM SERPL-MCNC: 8.7 MG/DL (ref 8.5–10.5)
CALCIUM SERPL-MCNC: 9.2 MG/DL (ref 8.5–10.5)
CHLORIDE SERPL-SCNC: 101 MMOL/L (ref 96–112)
CHLORIDE SERPL-SCNC: 103 MMOL/L (ref 96–112)
CO2 SERPL-SCNC: 19 MMOL/L (ref 20–33)
CO2 SERPL-SCNC: 20 MMOL/L (ref 20–33)
CREAT SERPL-MCNC: 2.11 MG/DL (ref 0.5–1.4)
CREAT SERPL-MCNC: 2.13 MG/DL (ref 0.5–1.4)
EOSINOPHIL # BLD AUTO: 0 K/UL (ref 0–0.51)
EOSINOPHIL NFR BLD: 0 % (ref 0–6.9)
ERYTHROCYTE [DISTWIDTH] IN BLOOD BY AUTOMATED COUNT: 41.8 FL (ref 35.9–50)
GFR SERPLBLD CREATININE-BSD FMLA CKD-EPI: 37 ML/MIN/1.73 M 2
GFR SERPLBLD CREATININE-BSD FMLA CKD-EPI: 38 ML/MIN/1.73 M 2
GLOBULIN SER CALC-MCNC: 2.3 G/DL (ref 1.9–3.5)
GLUCOSE BLD STRIP.AUTO-MCNC: 130 MG/DL (ref 65–99)
GLUCOSE BLD STRIP.AUTO-MCNC: 142 MG/DL (ref 65–99)
GLUCOSE BLD STRIP.AUTO-MCNC: 147 MG/DL (ref 65–99)
GLUCOSE BLD STRIP.AUTO-MCNC: 175 MG/DL (ref 65–99)
GLUCOSE SERPL-MCNC: 161 MG/DL (ref 65–99)
GLUCOSE SERPL-MCNC: 175 MG/DL (ref 65–99)
HCT VFR BLD AUTO: 36.8 % (ref 42–52)
HGB BLD-MCNC: 12 G/DL (ref 14–18)
IMM GRANULOCYTES # BLD AUTO: 0.04 K/UL (ref 0–0.11)
IMM GRANULOCYTES NFR BLD AUTO: 0.5 % (ref 0–0.9)
LYMPHOCYTES # BLD AUTO: 0.64 K/UL (ref 1–4.8)
LYMPHOCYTES NFR BLD: 8.3 % (ref 22–41)
MAGNESIUM SERPL-MCNC: 2.5 MG/DL (ref 1.5–2.5)
MCH RBC QN AUTO: 28.6 PG (ref 27–33)
MCHC RBC AUTO-ENTMCNC: 32.6 G/DL (ref 32.3–36.5)
MCV RBC AUTO: 87.8 FL (ref 81.4–97.8)
MONOCYTES # BLD AUTO: 0.17 K/UL (ref 0–0.85)
MONOCYTES NFR BLD AUTO: 2.2 % (ref 0–13.4)
NEUTROPHILS # BLD AUTO: 6.88 K/UL (ref 1.82–7.42)
NEUTROPHILS NFR BLD: 88.9 % (ref 44–72)
NRBC # BLD AUTO: 0 K/UL
NRBC BLD-RTO: 0 /100 WBC (ref 0–0.2)
PHOSPHATE SERPL-MCNC: 5.5 MG/DL (ref 2.5–4.5)
PLATELET # BLD AUTO: 267 K/UL (ref 164–446)
PMV BLD AUTO: 9.5 FL (ref 9–12.9)
POTASSIUM SERPL-SCNC: 4.9 MMOL/L (ref 3.6–5.5)
POTASSIUM SERPL-SCNC: 5.4 MMOL/L (ref 3.6–5.5)
PROT SERPL-MCNC: 5.8 G/DL (ref 6–8.2)
RBC # BLD AUTO: 4.19 M/UL (ref 4.7–6.1)
SODIUM SERPL-SCNC: 134 MMOL/L (ref 135–145)
SODIUM SERPL-SCNC: 135 MMOL/L (ref 135–145)
WBC # BLD AUTO: 7.7 K/UL (ref 4.8–10.8)

## 2024-08-19 PROCEDURE — 80048 BASIC METABOLIC PNL TOTAL CA: CPT

## 2024-08-19 PROCEDURE — 700102 HCHG RX REV CODE 250 W/ 637 OVERRIDE(OP): Performed by: NURSE PRACTITIONER

## 2024-08-19 PROCEDURE — 700111 HCHG RX REV CODE 636 W/ 250 OVERRIDE (IP): Mod: JZ | Performed by: REGISTERED NURSE

## 2024-08-19 PROCEDURE — A9270 NON-COVERED ITEM OR SERVICE: HCPCS | Performed by: NURSE PRACTITIONER

## 2024-08-19 PROCEDURE — 700102 HCHG RX REV CODE 250 W/ 637 OVERRIDE(OP): Performed by: PHYSICIAN ASSISTANT

## 2024-08-19 PROCEDURE — 97162 PT EVAL MOD COMPLEX 30 MIN: CPT

## 2024-08-19 PROCEDURE — 770001 HCHG ROOM/CARE - MED/SURG/GYN PRIV*

## 2024-08-19 PROCEDURE — 84100 ASSAY OF PHOSPHORUS: CPT

## 2024-08-19 PROCEDURE — 94669 MECHANICAL CHEST WALL OSCILL: CPT

## 2024-08-19 PROCEDURE — 700105 HCHG RX REV CODE 258: Performed by: NURSE PRACTITIONER

## 2024-08-19 PROCEDURE — 700102 HCHG RX REV CODE 250 W/ 637 OVERRIDE(OP): Performed by: REGISTERED NURSE

## 2024-08-19 PROCEDURE — 83735 ASSAY OF MAGNESIUM: CPT

## 2024-08-19 PROCEDURE — A9270 NON-COVERED ITEM OR SERVICE: HCPCS | Performed by: PHYSICIAN ASSISTANT

## 2024-08-19 PROCEDURE — 82962 GLUCOSE BLOOD TEST: CPT | Mod: 91

## 2024-08-19 PROCEDURE — 700105 HCHG RX REV CODE 258: Performed by: REGISTERED NURSE

## 2024-08-19 PROCEDURE — 97535 SELF CARE MNGMENT TRAINING: CPT

## 2024-08-19 PROCEDURE — 71045 X-RAY EXAM CHEST 1 VIEW: CPT

## 2024-08-19 PROCEDURE — 700101 HCHG RX REV CODE 250: Performed by: REGISTERED NURSE

## 2024-08-19 PROCEDURE — A9270 NON-COVERED ITEM OR SERVICE: HCPCS | Performed by: REGISTERED NURSE

## 2024-08-19 PROCEDURE — 80053 COMPREHEN METABOLIC PANEL: CPT

## 2024-08-19 PROCEDURE — 85025 COMPLETE CBC W/AUTO DIFF WBC: CPT

## 2024-08-19 PROCEDURE — 99232 SBSQ HOSP IP/OBS MODERATE 35: CPT | Performed by: NURSE PRACTITIONER

## 2024-08-19 PROCEDURE — 97165 OT EVAL LOW COMPLEX 30 MIN: CPT

## 2024-08-19 RX ORDER — HYDROMORPHONE HYDROCHLORIDE 1 MG/ML
0.5 INJECTION, SOLUTION INTRAMUSCULAR; INTRAVENOUS; SUBCUTANEOUS
Status: DISCONTINUED | OUTPATIENT
Start: 2024-08-19 | End: 2024-08-20 | Stop reason: HOSPADM

## 2024-08-19 RX ORDER — SODIUM CHLORIDE, SODIUM LACTATE, POTASSIUM CHLORIDE, AND CALCIUM CHLORIDE .6; .31; .03; .02 G/100ML; G/100ML; G/100ML; G/100ML
1000 INJECTION, SOLUTION INTRAVENOUS ONCE
Status: COMPLETED | OUTPATIENT
Start: 2024-08-19 | End: 2024-08-19

## 2024-08-19 RX ORDER — OXYCODONE HYDROCHLORIDE 5 MG/1
5 TABLET ORAL
Status: DISCONTINUED | OUTPATIENT
Start: 2024-08-19 | End: 2024-08-20 | Stop reason: HOSPADM

## 2024-08-19 RX ORDER — OXYCODONE HYDROCHLORIDE 10 MG/1
10 TABLET ORAL
Status: DISCONTINUED | OUTPATIENT
Start: 2024-08-19 | End: 2024-08-20 | Stop reason: HOSPADM

## 2024-08-19 RX ORDER — SODIUM CHLORIDE, SODIUM LACTATE, POTASSIUM CHLORIDE, CALCIUM CHLORIDE 600; 310; 30; 20 MG/100ML; MG/100ML; MG/100ML; MG/100ML
INJECTION, SOLUTION INTRAVENOUS CONTINUOUS
Status: DISCONTINUED | OUTPATIENT
Start: 2024-08-19 | End: 2024-08-20 | Stop reason: HOSPADM

## 2024-08-19 RX ADMIN — ACETAMINOPHEN 1000 MG: 500 TABLET ORAL at 11:39

## 2024-08-19 RX ADMIN — ACETAMINOPHEN 1000 MG: 500 TABLET ORAL at 05:19

## 2024-08-19 RX ADMIN — OXYCODONE HYDROCHLORIDE 10 MG: 10 TABLET ORAL at 21:07

## 2024-08-19 RX ADMIN — SODIUM CHLORIDE, POTASSIUM CHLORIDE, SODIUM LACTATE AND CALCIUM CHLORIDE: 600; 310; 30; 20 INJECTION, SOLUTION INTRAVENOUS at 06:23

## 2024-08-19 RX ADMIN — ENOXAPARIN SODIUM 40 MG: 100 INJECTION SUBCUTANEOUS at 17:27

## 2024-08-19 RX ADMIN — LIDOCAINE 2 PATCH: 4 PATCH TOPICAL at 08:35

## 2024-08-19 RX ADMIN — DOCUSATE SODIUM 100 MG: 100 CAPSULE, LIQUID FILLED ORAL at 05:24

## 2024-08-19 RX ADMIN — GABAPENTIN 300 MG: 300 CAPSULE ORAL at 05:20

## 2024-08-19 RX ADMIN — ACETAMINOPHEN 1000 MG: 500 TABLET ORAL at 17:27

## 2024-08-19 RX ADMIN — SODIUM CHLORIDE, POTASSIUM CHLORIDE, SODIUM LACTATE AND CALCIUM CHLORIDE 1000 ML: 600; 310; 30; 20 INJECTION, SOLUTION INTRAVENOUS at 14:15

## 2024-08-19 RX ADMIN — SODIUM CHLORIDE, POTASSIUM CHLORIDE, SODIUM LACTATE AND CALCIUM CHLORIDE: 600; 310; 30; 20 INJECTION, SOLUTION INTRAVENOUS at 17:23

## 2024-08-19 RX ADMIN — METAXALONE 800 MG: 800 TABLET ORAL at 17:27

## 2024-08-19 RX ADMIN — DOCUSATE SODIUM 100 MG: 100 CAPSULE, LIQUID FILLED ORAL at 17:27

## 2024-08-19 RX ADMIN — DAPAGLIFLOZIN 10 MG: 10 TABLET, FILM COATED ORAL at 05:35

## 2024-08-19 RX ADMIN — OXYCODONE HYDROCHLORIDE 5 MG: 5 TABLET ORAL at 11:39

## 2024-08-19 RX ADMIN — GABAPENTIN 300 MG: 300 CAPSULE ORAL at 14:31

## 2024-08-19 RX ADMIN — METAXALONE 800 MG: 800 TABLET ORAL at 11:40

## 2024-08-19 RX ADMIN — SODIUM CHLORIDE, POTASSIUM CHLORIDE, SODIUM LACTATE AND CALCIUM CHLORIDE: 600; 310; 30; 20 INJECTION, SOLUTION INTRAVENOUS at 21:13

## 2024-08-19 RX ADMIN — LISINOPRIL 20 MG: 20 TABLET ORAL at 05:20

## 2024-08-19 RX ADMIN — OXYCODONE HYDROCHLORIDE 5 MG: 5 TABLET ORAL at 03:40

## 2024-08-19 RX ADMIN — SIMVASTATIN 40 MG: 20 TABLET, FILM COATED ORAL at 21:00

## 2024-08-19 RX ADMIN — SENNOSIDES AND DOCUSATE SODIUM 1 TABLET: 50; 8.6 TABLET ORAL at 20:08

## 2024-08-19 RX ADMIN — GABAPENTIN 300 MG: 300 CAPSULE ORAL at 21:07

## 2024-08-19 RX ADMIN — FAMOTIDINE 20 MG: 20 TABLET, FILM COATED ORAL at 05:24

## 2024-08-19 RX ADMIN — NICOTINE TRANSDERMAL SYSTEM 21 MG: 21 PATCH, EXTENDED RELEASE TRANSDERMAL at 06:00

## 2024-08-19 RX ADMIN — INSULIN HUMAN 2 UNITS: 100 INJECTION, SOLUTION PARENTERAL at 05:35

## 2024-08-19 RX ADMIN — METAXALONE 800 MG: 800 TABLET ORAL at 05:20

## 2024-08-19 SDOH — ECONOMIC STABILITY: TRANSPORTATION INSECURITY
IN THE PAST 12 MONTHS, HAS LACK OF RELIABLE TRANSPORTATION KEPT YOU FROM MEDICAL APPOINTMENTS, MEETINGS, WORK OR FROM GETTING THINGS NEEDED FOR DAILY LIVING?: NO

## 2024-08-19 SDOH — ECONOMIC STABILITY: TRANSPORTATION INSECURITY
IN THE PAST 12 MONTHS, HAS THE LACK OF TRANSPORTATION KEPT YOU FROM MEDICAL APPOINTMENTS OR FROM GETTING MEDICATIONS?: NO

## 2024-08-19 ASSESSMENT — PAIN DESCRIPTION - PAIN TYPE
TYPE: ACUTE PAIN
TYPE: ACUTE PAIN;SURGICAL PAIN
TYPE: ACUTE PAIN
TYPE: ACUTE PAIN;SURGICAL PAIN

## 2024-08-19 ASSESSMENT — COGNITIVE AND FUNCTIONAL STATUS - GENERAL
SUGGESTED CMS G CODE MODIFIER DAILY ACTIVITY: CH
MOBILITY SCORE: 24
SUGGESTED CMS G CODE MODIFIER MOBILITY: CH
DAILY ACTIVITIY SCORE: 24

## 2024-08-19 ASSESSMENT — ENCOUNTER SYMPTOMS
ABDOMINAL PAIN: 0
TINGLING: 0
CHILLS: 0
SHORTNESS OF BREATH: 0
FEVER: 0
HEADACHES: 0
DOUBLE VISION: 0
SENSORY CHANGE: 0
DIZZINESS: 0
MYALGIAS: 1
NAUSEA: 0
FOCAL WEAKNESS: 0
PALPITATIONS: 0
COUGH: 0
VOMITING: 0
SPEECH CHANGE: 0
BLURRED VISION: 0
TREMORS: 0

## 2024-08-19 ASSESSMENT — LIFESTYLE VARIABLES
TOTAL SCORE: 0
HOW MANY TIMES IN THE PAST YEAR HAVE YOU HAD 5 OR MORE DRINKS IN A DAY: 0
AVERAGE NUMBER OF DAYS PER WEEK YOU HAVE A DRINK CONTAINING ALCOHOL: 0
HAVE YOU EVER FELT YOU SHOULD CUT DOWN ON YOUR DRINKING: NO
ALCOHOL_USE: NO
CONSUMPTION TOTAL: NEGATIVE
TOTAL SCORE: 0
TOTAL SCORE: 0
ON A TYPICAL DAY WHEN YOU DRINK ALCOHOL HOW MANY DRINKS DO YOU HAVE: 0
EVER HAD A DRINK FIRST THING IN THE MORNING TO STEADY YOUR NERVES TO GET RID OF A HANGOVER: NO
EVER FELT BAD OR GUILTY ABOUT YOUR DRINKING: NO
DOES PATIENT WANT TO STOP DRINKING: NO
HAVE PEOPLE ANNOYED YOU BY CRITICIZING YOUR DRINKING: NO

## 2024-08-19 ASSESSMENT — GAIT ASSESSMENTS
DISTANCE (FEET): 400
GAIT LEVEL OF ASSIST: SUPERVISED
DEVIATION: NO DEVIATION

## 2024-08-19 ASSESSMENT — SOCIAL DETERMINANTS OF HEALTH (SDOH)
WITHIN THE LAST YEAR, HAVE YOU BEEN KICKED, HIT, SLAPPED, OR OTHERWISE PHYSICALLY HURT BY YOUR PARTNER OR EX-PARTNER?: NO
WITHIN THE LAST YEAR, HAVE TO BEEN RAPED OR FORCED TO HAVE ANY KIND OF SEXUAL ACTIVITY BY YOUR PARTNER OR EX-PARTNER?: NO
WITHIN THE LAST YEAR, HAVE YOU BEEN HUMILIATED OR EMOTIONALLY ABUSED IN OTHER WAYS BY YOUR PARTNER OR EX-PARTNER?: NO
IN THE PAST 12 MONTHS, HAS THE ELECTRIC, GAS, OIL, OR WATER COMPANY THREATENED TO SHUT OFF SERVICE IN YOUR HOME?: NO
WITHIN THE PAST 12 MONTHS, THE FOOD YOU BOUGHT JUST DIDN'T LAST AND YOU DIDN'T HAVE MONEY TO GET MORE: NEVER TRUE
WITHIN THE LAST YEAR, HAVE YOU BEEN AFRAID OF YOUR PARTNER OR EX-PARTNER?: NO
WITHIN THE PAST 12 MONTHS, YOU WORRIED THAT YOUR FOOD WOULD RUN OUT BEFORE YOU GOT THE MONEY TO BUY MORE: NEVER TRUE

## 2024-08-19 ASSESSMENT — PATIENT HEALTH QUESTIONNAIRE - PHQ9
2. FEELING DOWN, DEPRESSED, IRRITABLE, OR HOPELESS: NOT AT ALL
SUM OF ALL RESPONSES TO PHQ9 QUESTIONS 1 AND 2: 0
1. LITTLE INTEREST OR PLEASURE IN DOING THINGS: NOT AT ALL

## 2024-08-19 ASSESSMENT — ACTIVITIES OF DAILY LIVING (ADL): TOILETING: INDEPENDENT

## 2024-08-19 NOTE — PROGRESS NOTES
"    Orthopedic PA Progress Note    Interval changes:  Patient doing well postop  RUE dressings are CDI  No lifting > cup of coffee, ok for ADLs  No pending ortho procedures  Follow up with orthopedics back home in [place] in 2 weeks  Cleared for DC from orthopedic standpoint     ROS - Patient denies any new issues.  Denies any numbness or tingling. Pain well controlled.    BP (!) 152/73   Pulse 72   Temp 36.8 °C (98.2 °F) (Temporal)   Resp (!) 35   Ht 1.702 m (5' 7\")   Wt 83.9 kg (185 lb)   SpO2 97%     Patient seen and examined  No acute distress  Breathing non labored  RRR  R clavicle: Surgical dressing is clean, dry, and intact. Patient clearly fires forearm flexors, forearm extensors, and moves all five fingers without issue . Sensation is intact to light touch throughout median, ulnar, and radial nerve distributions. Strong and palpable radial pulses with capillary refill less than 2 seconds.   Recent Labs     08/18/24  0317 08/18/24  0639 08/19/24  0322   WBC 9.80 9.6 7.7   RBC 4.51 4.15* 4.19*   HEMOGLOBIN 13.2* 12.2* 12.0*   HEMATOCRIT 38.9 36.4* 36.8*   MCV 86.3 87.7 87.8   MCH 29.3 29.4 28.6   MCHC 34.0 33.5 32.6   RDW 13.9 43.7 41.8   PLATELETCT 273 249 267   MPV 7.8 9.3 9.5       Active Hospital Problems    Diagnosis     Closed fracture of multiple ribs of both sides [S22.43XA]      Priority: High    Acute respiratory failure with hypoxia (HCC) [J96.01]      Priority: High    Pneumothorax on right [J93.9]      Priority: High    Right pulmonary contusion [S27.321A]      Priority: Medium    Closed displaced fracture of shaft of right clavicle [S42.021A]      Priority: Medium    No contraindication to deep vein thrombosis (DVT) prophylaxis [Z78.9]      Priority: Medium    Mixed hyperlipidemia [E78.2]      Priority: Medium    Stage 2 chronic kidney disease [N18.2]      Priority: Medium    Type 2 diabetes mellitus without complication, without long-term current use of insulin (HCC) [E11.9]      Priority: " Medium    Primary hypertension [I10]      Priority: Medium    Neuropathy [G62.9]      Priority: Medium    Nodule of right lung [R91.1]      Priority: Medium    Trauma [T14.90XA]      Priority: Low       DX-CHEST-PORTABLE (1 VIEW)   Final Result         1. Stable right basilar atelectasis and small right pleural effusion.   2. Bilateral rib fractures and right clavicle fracture are noted.   3. Indeterminate 9 mm right lung nodule again noted. Recommend PET/CT or 3 month chest CT for follow-up.      DX-PORTABLE FLUOROSCOPY < 1 HOUR Reason For Exam: Main OR   Preliminary Result      Portable fluoroscopy utilized for 5 seconds.         INTERPRETING LOCATION: [address], [place], [address]      DX-CLAVICLE RIGHT   Preliminary Result      Digitized intraoperative radiograph is submitted for review. This examination is not for diagnostic purpose but for guidance during a surgical procedure. Please see the patient's chart for full procedural details.         INTERPRETING LOCATION: [address], [place], [address]      DX-CHEST-LIMITED (1 VIEW)   Final Result         1. Stable right basilar atelectasis and small right pleural effusion.   2. Bilateral rib fractures and right clavicle fracture are noted.   3. Indeterminate 9 mm right lung nodule again noted. Recommend PET/CT or 3 month chest CT for follow-up.      OUTSIDE IMAGES-CT ABDOMEN /PELVIS   Final Result      OUTSIDE IMAGES-CT CHEST   Final Result          Assessment/Plan:  Patient doing well postop  RUE dressings are CDI  No lifting > cup of coffee, ok for ADLs  No pending ortho procedures  Follow up with orthopedics back home in [place] in 2 weeks  Cleared for DC from orthopedic standpoint     POD#1 S/p   Open reduction internal fixation of right clavicle fracture   Wt bearing status - No lifting > than cup of coffee, ok for ADLs  Wound care/Drains - Dressings to be changed every other day by nursing. Or PRN for saturation starting POD#2  Future Procedures - None planned    Lovenox: per trauma  Sutures/Staples out- 14-21 days post operatively. Removal will completed by ortho KASIE's unless transferred.  PT/OT-initiated  Antibiotics:  Perioperative completed  DVT Prophylaxis- TEDS/SCDs/Foot pumps  Williamson-not needed per ortho  Case Coordination for Discharge Planning - Disposition per therapy recs.
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
## 2024-08-19 NOTE — PROGRESS NOTES
Size large arm sling for the RUE sent to tube station 902.    Contact traction with any questions or concerns regarding the use of this brace.

## 2024-08-19 NOTE — CARE PLAN
"The patient is Watcher - Medium risk of patient condition declining or worsening    Shift Goals  Clinical Goals: ORIF; pain mgmt; pulmonary hygiene  Patient Goals: pain mgmt; rest; go home; \"race more\"  Family Goals: updates    Progress made toward(s) clinical / shift goals:    Problem: Pain - Standard  Goal: Alleviation of pain or a reduction in pain to the patient’s comfort goal  Outcome: Progressing     Problem: Knowledge Deficit - Standard  Goal: Patient and family/care givers will demonstrate understanding of plan of care, disease process/condition, diagnostic tests and medications  Outcome: Progressing     Problem: Skin Integrity  Goal: Skin integrity is maintained or improved  Outcome: Progressing     Problem: Fall Risk  Goal: Patient will remain free from falls  Outcome: Progressing       Patient is not progressing towards the following goals:      "

## 2024-08-19 NOTE — CARE PLAN
The patient is Stable - Low risk of patient condition declining or worsening    Shift Goals  Clinical Goals: pain management; pulmonary hygiene; mobility  Patient Goals: cough comfortably; discharge  Family Goals: VINITA    Progress made toward(s) clinical / shift goals:    Problem: Pain - Standard  Goal: Alleviation of pain or a reduction in pain to the patient’s comfort goal  Outcome: Progressing     Problem: Knowledge Deficit - Standard  Goal: Patient and family/care givers will demonstrate understanding of plan of care, disease process/condition, diagnostic tests and medications  Outcome: Progressing     Problem: Fall Risk  Goal: Patient will remain free from falls  Outcome: Progressing

## 2024-08-19 NOTE — OR NURSING
"Pt is alert, but confused at times as to if the procedure has been done. He is resting comfortably in hospital bed on 2lpm nasal cannula. His respirations are equal and unlabored, but has a splinted, weak cough. Pt states it \"hurts pretty bad to cough\". Surgical site is intact, but slightly oozy with ice pack in place. He is treated for pain per MAR. He takes sips of water without difficulty or complaints of n/v. CMS intact. Will continue to monitor.     Handoff to Jec RN and aware of pt pending arrival to unit.  "

## 2024-08-19 NOTE — CARE PLAN
Problem: Hyperinflation  Goal: Prevent or improve atelectasis  Description: Target End Date:  3 to 4 days    1. Instruct incentive spirometry usage  2.  Perform hyperinflation therapy as indicated  Outcome: Progressing       Respiratory Update    Treatment modality: PEP  Frequency: QID  IS: 1000  Pt tolerating current treatments well with no adverse reactions.
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
## 2024-08-19 NOTE — FLOWSHEET NOTE
08/19/24 1525   Incentive Spirometry Treatment   Incentive Spirometer Volume 2500 mL   Chest Physiotherapy Treatment   $ PEP/CPT Performed PEP / Flutter     QID

## 2024-08-20 ENCOUNTER — PHARMACY VISIT (OUTPATIENT)
Dept: PHARMACY | Facility: MEDICAL CENTER | Age: 49
End: 2024-08-20
Payer: COMMERCIAL

## 2024-08-20 ENCOUNTER — APPOINTMENT (OUTPATIENT)
Dept: RADIOLOGY | Facility: MEDICAL CENTER | Age: 49
DRG: 515 | End: 2024-08-20
Attending: REGISTERED NURSE
Payer: COMMERCIAL

## 2024-08-20 VITALS
BODY MASS INDEX: 29.03 KG/M2 | SYSTOLIC BLOOD PRESSURE: 140 MMHG | OXYGEN SATURATION: 90 % | RESPIRATION RATE: 16 BRPM | WEIGHT: 185 LBS | HEART RATE: 71 BPM | HEIGHT: 67 IN | TEMPERATURE: 97.9 F | DIASTOLIC BLOOD PRESSURE: 77 MMHG

## 2024-08-20 LAB
ALBUMIN SERPL BCP-MCNC: 3 G/DL (ref 3.2–4.9)
ALBUMIN/GLOB SERPL: 1.2 G/DL
ALP SERPL-CCNC: 63 U/L (ref 30–99)
ALT SERPL-CCNC: 13 U/L (ref 2–50)
ANION GAP SERPL CALC-SCNC: 11 MMOL/L (ref 7–16)
ANION GAP SERPL CALC-SCNC: 11 MMOL/L (ref 7–16)
AST SERPL-CCNC: 21 U/L (ref 12–45)
BASOPHILS # BLD AUTO: 0.4 % (ref 0–1.8)
BASOPHILS # BLD: 0.04 K/UL (ref 0–0.12)
BILIRUB SERPL-MCNC: 0.3 MG/DL (ref 0.1–1.5)
BUN SERPL-MCNC: 33 MG/DL (ref 8–22)
BUN SERPL-MCNC: 35 MG/DL (ref 8–22)
CALCIUM ALBUM COR SERPL-MCNC: 9.4 MG/DL (ref 8.5–10.5)
CALCIUM SERPL-MCNC: 8.6 MG/DL (ref 8.5–10.5)
CALCIUM SERPL-MCNC: 9.1 MG/DL (ref 8.5–10.5)
CHLORIDE SERPL-SCNC: 103 MMOL/L (ref 96–112)
CHLORIDE SERPL-SCNC: 104 MMOL/L (ref 96–112)
CO2 SERPL-SCNC: 21 MMOL/L (ref 20–33)
CO2 SERPL-SCNC: 22 MMOL/L (ref 20–33)
CREAT SERPL-MCNC: 1.99 MG/DL (ref 0.5–1.4)
CREAT SERPL-MCNC: 2.04 MG/DL (ref 0.5–1.4)
EOSINOPHIL # BLD AUTO: 0.07 K/UL (ref 0–0.51)
EOSINOPHIL NFR BLD: 0.7 % (ref 0–6.9)
ERYTHROCYTE [DISTWIDTH] IN BLOOD BY AUTOMATED COUNT: 41.6 FL (ref 35.9–50)
GFR SERPLBLD CREATININE-BSD FMLA CKD-EPI: 39 ML/MIN/1.73 M 2
GFR SERPLBLD CREATININE-BSD FMLA CKD-EPI: 40 ML/MIN/1.73 M 2
GLOBULIN SER CALC-MCNC: 2.5 G/DL (ref 1.9–3.5)
GLUCOSE BLD STRIP.AUTO-MCNC: 151 MG/DL (ref 65–99)
GLUCOSE SERPL-MCNC: 140 MG/DL (ref 65–99)
GLUCOSE SERPL-MCNC: 154 MG/DL (ref 65–99)
HCT VFR BLD AUTO: 30.4 % (ref 42–52)
HGB BLD-MCNC: 10.1 G/DL (ref 14–18)
IMM GRANULOCYTES # BLD AUTO: 0.03 K/UL (ref 0–0.11)
IMM GRANULOCYTES NFR BLD AUTO: 0.3 % (ref 0–0.9)
LYMPHOCYTES # BLD AUTO: 2.29 K/UL (ref 1–4.8)
LYMPHOCYTES NFR BLD: 23.3 % (ref 22–41)
MCH RBC QN AUTO: 28.6 PG (ref 27–33)
MCHC RBC AUTO-ENTMCNC: 33.2 G/DL (ref 32.3–36.5)
MCV RBC AUTO: 86.1 FL (ref 81.4–97.8)
MONOCYTES # BLD AUTO: 0.59 K/UL (ref 0–0.85)
MONOCYTES NFR BLD AUTO: 6 % (ref 0–13.4)
NEUTROPHILS # BLD AUTO: 6.79 K/UL (ref 1.82–7.42)
NEUTROPHILS NFR BLD: 69.3 % (ref 44–72)
NRBC # BLD AUTO: 0 K/UL
NRBC BLD-RTO: 0 /100 WBC (ref 0–0.2)
PLATELET # BLD AUTO: 243 K/UL (ref 164–446)
PMV BLD AUTO: 9.5 FL (ref 9–12.9)
POTASSIUM SERPL-SCNC: 4.6 MMOL/L (ref 3.6–5.5)
POTASSIUM SERPL-SCNC: 4.8 MMOL/L (ref 3.6–5.5)
PROT SERPL-MCNC: 5.5 G/DL (ref 6–8.2)
RBC # BLD AUTO: 3.53 M/UL (ref 4.7–6.1)
SODIUM SERPL-SCNC: 136 MMOL/L (ref 135–145)
SODIUM SERPL-SCNC: 136 MMOL/L (ref 135–145)
WBC # BLD AUTO: 9.8 K/UL (ref 4.8–10.8)

## 2024-08-20 PROCEDURE — 700101 HCHG RX REV CODE 250: Performed by: REGISTERED NURSE

## 2024-08-20 PROCEDURE — A9270 NON-COVERED ITEM OR SERVICE: HCPCS | Performed by: NURSE PRACTITIONER

## 2024-08-20 PROCEDURE — A9270 NON-COVERED ITEM OR SERVICE: HCPCS | Performed by: REGISTERED NURSE

## 2024-08-20 PROCEDURE — 71045 X-RAY EXAM CHEST 1 VIEW: CPT

## 2024-08-20 PROCEDURE — 700102 HCHG RX REV CODE 250 W/ 637 OVERRIDE(OP): Performed by: PHYSICIAN ASSISTANT

## 2024-08-20 PROCEDURE — 94669 MECHANICAL CHEST WALL OSCILL: CPT

## 2024-08-20 PROCEDURE — 80048 BASIC METABOLIC PNL TOTAL CA: CPT

## 2024-08-20 PROCEDURE — 700102 HCHG RX REV CODE 250 W/ 637 OVERRIDE(OP): Performed by: NURSE PRACTITIONER

## 2024-08-20 PROCEDURE — 80053 COMPREHEN METABOLIC PANEL: CPT

## 2024-08-20 PROCEDURE — RXMED WILLOW AMBULATORY MEDICATION CHARGE: Performed by: NURSE PRACTITIONER

## 2024-08-20 PROCEDURE — 99239 HOSP IP/OBS DSCHRG MGMT >30: CPT | Performed by: NURSE PRACTITIONER

## 2024-08-20 PROCEDURE — 82962 GLUCOSE BLOOD TEST: CPT

## 2024-08-20 PROCEDURE — 85025 COMPLETE CBC W/AUTO DIFF WBC: CPT

## 2024-08-20 PROCEDURE — 700102 HCHG RX REV CODE 250 W/ 637 OVERRIDE(OP): Performed by: REGISTERED NURSE

## 2024-08-20 PROCEDURE — 94760 N-INVAS EAR/PLS OXIMETRY 1: CPT

## 2024-08-20 PROCEDURE — A9270 NON-COVERED ITEM OR SERVICE: HCPCS | Performed by: PHYSICIAN ASSISTANT

## 2024-08-20 RX ORDER — AMOXICILLIN 250 MG
1 CAPSULE ORAL 2 TIMES DAILY
Qty: 10 TABLET | Refills: 0 | Status: SHIPPED | OUTPATIENT
Start: 2024-08-20 | End: 2024-08-25

## 2024-08-20 RX ORDER — OXYCODONE HYDROCHLORIDE 5 MG/1
5 TABLET ORAL EVERY 4 HOURS PRN
Qty: 18 TABLET | Refills: 0 | Status: SHIPPED | OUTPATIENT
Start: 2024-08-20 | End: 2024-08-23

## 2024-08-20 RX ORDER — METAXALONE 800 MG/1
800 TABLET ORAL 3 TIMES DAILY PRN
Qty: 9 TABLET | Refills: 0 | Status: SHIPPED | OUTPATIENT
Start: 2024-08-20 | End: 2024-08-23

## 2024-08-20 RX ORDER — LIDOCAINE 4 G/G
2 PATCH TOPICAL EVERY 24 HOURS
Qty: 10 PATCH | Refills: 0 | Status: SHIPPED | OUTPATIENT
Start: 2024-08-21 | End: 2024-08-26

## 2024-08-20 RX ADMIN — POLYETHYLENE GLYCOL 3350 1 PACKET: 17 POWDER, FOR SOLUTION ORAL at 05:44

## 2024-08-20 RX ADMIN — GABAPENTIN 300 MG: 300 CAPSULE ORAL at 05:44

## 2024-08-20 RX ADMIN — LIDOCAINE 2 PATCH: 4 PATCH TOPICAL at 08:22

## 2024-08-20 RX ADMIN — ACETAMINOPHEN 1000 MG: 500 TABLET ORAL at 00:05

## 2024-08-20 RX ADMIN — OXYCODONE HYDROCHLORIDE 10 MG: 10 TABLET ORAL at 05:44

## 2024-08-20 RX ADMIN — DAPAGLIFLOZIN 10 MG: 10 TABLET, FILM COATED ORAL at 05:45

## 2024-08-20 RX ADMIN — ACETAMINOPHEN 1000 MG: 500 TABLET ORAL at 05:45

## 2024-08-20 RX ADMIN — OXYCODONE HYDROCHLORIDE 10 MG: 10 TABLET ORAL at 08:32

## 2024-08-20 RX ADMIN — FAMOTIDINE 20 MG: 20 TABLET, FILM COATED ORAL at 05:45

## 2024-08-20 RX ADMIN — INSULIN HUMAN 2 UNITS: 100 INJECTION, SOLUTION PARENTERAL at 06:48

## 2024-08-20 RX ADMIN — NICOTINE TRANSDERMAL SYSTEM 21 MG: 21 PATCH, EXTENDED RELEASE TRANSDERMAL at 05:44

## 2024-08-20 RX ADMIN — DOCUSATE SODIUM 100 MG: 100 CAPSULE, LIQUID FILLED ORAL at 05:45

## 2024-08-20 RX ADMIN — METAXALONE 800 MG: 800 TABLET ORAL at 05:44

## 2024-08-20 ASSESSMENT — PAIN DESCRIPTION - PAIN TYPE
TYPE: ACUTE PAIN;SURGICAL PAIN
TYPE: ACUTE PAIN;SURGICAL PAIN
TYPE: ACUTE PAIN

## 2024-08-20 ASSESSMENT — PAIN SCALES - GENERAL: PAIN_LEVEL: 5

## 2024-08-20 NOTE — PROGRESS NOTES
Report received from TICU RN, Perla at 2030.  Patient arrived to [place] [address] via transport 2050.  Assessment complete.    A&O x 4. Patient calls appropriately.  Patient ambulates with standby to no assist. Bed alarm off.   Patient has 8/10 pain. Patient medicated per MAR.  Denies N&V. Tolerating diabetic diet.  Right clavicle incision, dressing in place, CDI.  + void, + flatus, - BM, last BM PTA.  Patient denies SOB.  SCD's off, patient ambulatory.    Review plan with of care with patient. Call light and personal belongings within reach. Hourly rounding in place. All needs met at this time.

## 2024-08-20 NOTE — PROGRESS NOTES
BMP reviewed, creatinine 2.13 after LR bolus  Discussed with patient and wife    Plan:  Continue IV fluids overnight  Repeat BMP in am  Monitor intake and output   Transfer to [facility]

## 2024-08-20 NOTE — PROGRESS NOTES
4 Eyes Skin Assessment Completed by ALEX Serrano and ALEX Drake.    Head WDL  Ears WDL  Nose WDL  Mouth WDL  Neck WDL  Breast/Chest Bruising to R axillary into flank  Shoulder Blades WDL  Spine WDL  (R) Arm/Elbow/Hand Bruising to upper inner arm  (L) Arm/Elbow/Hand WDL  Abdomen WDL  Groin WDL  Scrotum/Coccyx/Buttocks Redness and Blanching  (R) Leg WDL  (L) Leg WDL  (R) Heel/Foot/Toe WDL  (L) Heel/Foot/Toe WDL          Devices In Places Blood Pressure Cuff, Pulse Ox, and Nasal Cannula      Interventions In Place NC W/Ear Foams    Possible Skin Injury No    Pictures Uploaded Into Epic N/A  Wound Consult Placed N/A  RN Wound Prevention Protocol Ordered No

## 2024-08-20 NOTE — ANESTHESIA POSTPROCEDURE EVALUATION
Patient: Tera Flores    Procedure Summary       Date: 08/18/24 Room / Location: [place] [address] / SURGERY [facility]    Anesthesia Start: 1558 Anesthesia Stop: 1643    Procedure: ORIF, FRACTURE, CLAVICLE (Right: Chest) Diagnosis: (Right clavicle fracture)    Surgeons: Germán Hummel M.D. Responsible Provider: Sal Cabrera M.D.    Anesthesia Type: general ASA Status: 3            Final Anesthesia Type: general  Last vitals  BP   Blood Pressure: (!) 160/70    Temp   36.6 °C (97.9 °F)    Pulse   (!) 56   Resp   16    SpO2   98 %      Anesthesia Post Evaluation    Patient location during evaluation: PACU  Patient participation: complete - patient participated  Level of consciousness: sleepy but conscious  Pain score: 5    Airway patency: patent  Anesthetic complications: no  Cardiovascular status: hemodynamically stable  Respiratory status: acceptable  Hydration status: balanced    PONV: none          There were no known notable events for this encounter.     Nurse Pain Score: 7 (NPRS)
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
## 2024-08-20 NOTE — CARE PLAN
The patient is Stable - Low risk of patient condition declining or worsening    Shift Goals  Clinical Goals: pain control, labs, rest, comfort  Patient Goals: rest, comfort  Family Goals: rest and DC    Progress made toward(s) clinical / shift goals:  Patient's pain managed per MAR. IV fluids admininistered per MAR. Labs to be monitored this AM. Patient able to rest during this shift.     Patient is not progressing towards the following goals:

## 2024-08-20 NOTE — DIETARY
[employer] - Alert received for newly identified wound. Wound team has not been consulted, no skin breakdown noted per 2 RN skin check.    RD will monitor per dept policy.

## 2024-08-20 NOTE — DISCHARGE INSTRUCTIONS
- Call or seek medical attention for questions or concerns  - Avoid nephrotoxins  - Resume regular diet  - May take over the counter acetaminophen as needed for pain  - Continue daily over the counter stool softener while on narcotics  - No operation of machinery or motorized vehicles while under the influence of narcotics  - No alcohol, marijuana or illicit drug use while under the influence of narcotics  - In the event of a narcotic overdose naloxone (Narcan) is available without a prescription from any [facility] or [facility]  - No swimming, hot tubs, baths or wound submersion until cleared by outpatient provider. May shower  - No contact sports, strenuous activities, or heavy lifting until cleared by outpatient provider  - Partial weight bearing right upper extremity. No lifting > cup of coffee, ok for ADLs, sling for comfort.  - Avoid higher elevations for prolonged periods of time.  No scuba diving or flying times two weeks.    - Stay hydrated  - If respiratory decompensation, persistent or worsening pain, decreased urine output or signs or symptoms of infection occur seek medical attention

## (undated) DEVICE — STAPLER SKIN DISP - (6/BX 10BX/CA) VISISTAT

## (undated) DEVICE — GLOVE BIOGEL SZ 7.5 SURGICAL PF LTX - (50PR/BX 4BX/CA)

## (undated) DEVICE — GOWN WARMING STANDARD FLEX - (30/CA)

## (undated) DEVICE — SUTURE GENERAL

## (undated) DEVICE — PENCIL ELECTSURG 10FT BTN SWH - (50/CA)

## (undated) DEVICE — SUCTION INSTRUMENT YANKAUER OPEN TIP W/O VENT (50EA/CA)

## (undated) DEVICE — NEEDLE NON SAFETY HYPO 22 GA X 1 1/2 IN (100/BX)

## (undated) DEVICE — SUTURE 2-0 VICRYL PLUS CT-1 - 8 X 18 INCH(12/BX)

## (undated) DEVICE — SUCTION INSTRUMENT YANKAUER BULBOUS TIP W/O VENT (50EA/CA)

## (undated) DEVICE — DRAPE SURG STERI-DRAPE 7X11OD - (10EA/BX, 40EA/CA)

## (undated) DEVICE — COVER LIGHT HANDLE ALC PLUS DISP (18EA/BX)

## (undated) DEVICE — SLEEVE, VASO, THIGH, MED

## (undated) DEVICE — GLOVE BIOGEL INDICATOR SZ 8 SURGICAL PF LTX - (50/BX 4BX/CA)

## (undated) DEVICE — ELECTRODE DUAL RETURN W/ CORD - (50/PK)

## (undated) DEVICE — DRILL BIT 1.8MMX110MM GOLD (6TX2=12)

## (undated) DEVICE — DRESSING TRANSPARENT FILM TEGADERM 4 X 4.75" (50EA/BX)"

## (undated) DEVICE — SET LEADWIRE 5 LEAD BEDSIDE DISPOSABLE ECG (1SET OF 5/EA)

## (undated) DEVICE — DRILL BIT 2.8MM X 155MM CALIBRATED (8TX2=16)

## (undated) DEVICE — SUTURE 0 VICRYL PLUS CT-1 - 8 X 18 INCH (12/BX)

## (undated) DEVICE — WATER IRRIGATION STERILE 1000ML (12EA/CA)

## (undated) DEVICE — BLADE SURGICAL #10 - (50/BX)

## (undated) DEVICE — SENSOR OXIMETER ADULT SPO2 RD SET (20EA/BX)

## (undated) DEVICE — LACTATED RINGERS INJ 1000 ML - (14EA/CA 60CA/PF)

## (undated) DEVICE — TUBING CLEARLINK DUO-VENT - C-FLO (48EA/CA)

## (undated) DEVICE — PACK MAJOR ORTHO - (2EA/CA)

## (undated) DEVICE — DRAPE 36X28IN RAD CARM BND BG - (25/CA)

## (undated) DEVICE — CANISTER SUCTION 3000ML MECHANICAL FILTER AUTO SHUTOFF MEDI-VAC NONSTERILE LF DISP (40EA/CA)

## (undated) DEVICE — SET EXTENSION WITH 2 PORTS (48EA/CA) ***PART #2C8610 IS A SUBSTITUTE*****

## (undated) DEVICE — CLOSURE SKIN STRIP 1/2 X 4 IN - (STERI STRIP) (50/BX 4BX/CA)

## (undated) DEVICE — SYRINGE 30 ML LL (56/BX)

## (undated) DEVICE — SODIUM CHL IRRIGATION 0.9% 1000ML (12EA/CA)